# Patient Record
Sex: MALE | Race: WHITE
[De-identification: names, ages, dates, MRNs, and addresses within clinical notes are randomized per-mention and may not be internally consistent; named-entity substitution may affect disease eponyms.]

---

## 2018-12-05 ENCOUNTER — APPOINTMENT (OUTPATIENT)
Dept: FAMILY MEDICINE | Facility: CLINIC | Age: 28
End: 2018-12-05
Payer: MEDICAID

## 2018-12-05 VITALS
HEART RATE: 67 BPM | HEIGHT: 72 IN | WEIGHT: 206 LBS | BODY MASS INDEX: 27.9 KG/M2 | RESPIRATION RATE: 16 BRPM | DIASTOLIC BLOOD PRESSURE: 80 MMHG | OXYGEN SATURATION: 98 % | SYSTOLIC BLOOD PRESSURE: 120 MMHG

## 2018-12-05 DIAGNOSIS — Z87.11 PERSONAL HISTORY OF PEPTIC ULCER DISEASE: ICD-10-CM

## 2018-12-05 DIAGNOSIS — M25.561 PAIN IN RIGHT KNEE: ICD-10-CM

## 2018-12-05 DIAGNOSIS — Z23 ENCOUNTER FOR IMMUNIZATION: ICD-10-CM

## 2018-12-05 PROCEDURE — 99213 OFFICE O/P EST LOW 20 MIN: CPT | Mod: 25

## 2018-12-05 PROCEDURE — 90686 IIV4 VACC NO PRSV 0.5 ML IM: CPT

## 2018-12-05 PROCEDURE — G0008: CPT

## 2018-12-05 NOTE — PHYSICAL EXAM
[No Acute Distress] : no acute distress [Well Nourished] : well nourished [Normal Sclera/Conjunctiva] : normal sclera/conjunctiva [Normal Outer Ear/Nose] : the outer ears and nose were normal in appearance [No Respiratory Distress] : no respiratory distress  [Clear to Auscultation] : lungs were clear to auscultation bilaterally [No Accessory Muscle Use] : no accessory muscle use [Normal Rate] : normal rate  [No Edema] : there was no peripheral edema [No Extremity Clubbing/Cyanosis] : no extremity clubbing/cyanosis [Soft] : abdomen soft [Non Tender] : non-tender [Non-distended] : non-distended [No Masses] : no abdominal mass palpated [Normal Bowel Sounds] : normal bowel sounds [Normal Affect] : the affect was normal [Normal Mood] : the mood was normal [de-identified] : No ecchymosis.  Mild edema right lateral knee, no erythema.  Decreased flexion secondary to pain, tenderness to palpation right lateral knee.  Anterior and Posterior drawer test negative.
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
## 2018-12-05 NOTE — REVIEW OF SYSTEMS
[Joint Pain] : joint pain [Joint Stiffness] : joint stiffness [Negative] : Neurological [FreeTextEntry9] : right knee

## 2018-12-05 NOTE — PLAN
[FreeTextEntry1] : Received flu vaccine.  Advised Mr. CELINE MEJÍA they may experience some soreness, tenderness at the injection site.  Advised to call office if  not improving.  Mr. MEJÍA expressed understanding.\par \par Referred to Orthopedics for further evaluation, possible meniscal injury.  Advised patient may continue icing the area.  Advised caution with NSAIDs given history of stomach ulcer.  GI precautions discussed.  Advised to return for evaluation if not improving.  Patient expressed understanding.  \par \par \par

## 2019-01-08 ENCOUNTER — APPOINTMENT (OUTPATIENT)
Dept: INTERNAL MEDICINE | Facility: CLINIC | Age: 29
End: 2019-01-08

## 2019-01-10 ENCOUNTER — APPOINTMENT (OUTPATIENT)
Dept: INTERNAL MEDICINE | Facility: CLINIC | Age: 29
End: 2019-01-10

## 2022-09-30 ENCOUNTER — APPOINTMENT (OUTPATIENT)
Dept: ORTHOPEDIC SURGERY | Facility: CLINIC | Age: 32
End: 2022-09-30
Payer: OTHER MISCELLANEOUS

## 2022-09-30 VITALS — BODY MASS INDEX: 27.9 KG/M2 | HEIGHT: 72 IN | WEIGHT: 206 LBS

## 2022-09-30 DIAGNOSIS — S43.005A UNSPECIFIED DISLOCATION OF LEFT SHOULDER JOINT, INITIAL ENCOUNTER: ICD-10-CM

## 2022-09-30 PROCEDURE — 99204 OFFICE O/P NEW MOD 45 MIN: CPT

## 2022-09-30 PROCEDURE — 73030 X-RAY EXAM OF SHOULDER: CPT | Mod: LT

## 2022-09-30 PROCEDURE — 99072 ADDL SUPL MATRL&STAF TM PHE: CPT

## 2022-09-30 NOTE — WORK
[Dislocation] : dislocation [Torn Ligament/Tendon/Muscle] : torn ligament, tendon or muscle [Was the competent medical cause of the injury] : was the competent medical cause of the injury [Are consistent with the injury] : are consistent with the injury [Consistent with my objective findings] : consistent with my objective findings [Total] : total [Cannot return to work because ________] : cannot return to work because [unfilled] [Unknown at this time] : : unknown at this time [Patient] : patient

## 2022-09-30 NOTE — HISTORY OF PRESENT ILLNESS
[Work related] : work related [Sudden] : sudden [9] : 9 [7] : 7 [Dull/Aching] : dull/aching [Sharp] : sharp [Stabbing] : stabbing [Frequent] : frequent [Leisure] : leisure [Rest] : rest [Exercising] : exercising [Not working due to injury] : Work status: not working due to injury [de-identified] : WC 9/29/22 \par \par 31 y/o RHD male here today for the L shoulder.  He was moving a piece of plywood, the wind blew over another piece of wood which hit the wood he was holding.  He felt the shoulder dislocate, but he was able to self reduce it 20 minutes later.    He went to [facility] where xrays showed reduction, and he was placed in a sling.  He has a h/o R shoulder dislocation.  No prior history of pain or dislocation on the left shoulder.  He is OOW.  \par \par PMHx: denies, 3 cis ger day [] : Post Surgical Visit: no [FreeTextEntry1] : right shoulder  [FreeTextEntry3] : 9/29/22 [FreeTextEntry5] : pt was carrying a piece of ply wood and that is when another piece flew out injuring his right shoulder  [de-identified] : [facility] [de-identified] : xrays [de-identified] :

## 2022-09-30 NOTE — ASSESSMENT
[FreeTextEntry1] : L shoulder dislocation.\par Sling, NWB.\par MRI to eval for labral tear.\par OOW. \par RTO for review.

## 2022-10-01 ENCOUNTER — FORM ENCOUNTER (OUTPATIENT)
Age: 32
End: 2022-10-01

## 2022-10-02 ENCOUNTER — APPOINTMENT (OUTPATIENT)
Dept: MRI IMAGING | Facility: CLINIC | Age: 32
End: 2022-10-02
Payer: OTHER MISCELLANEOUS

## 2022-10-02 PROCEDURE — 99072 ADDL SUPL MATRL&STAF TM PHE: CPT

## 2022-10-02 PROCEDURE — 73221 MRI JOINT UPR EXTREM W/O DYE: CPT | Mod: LT

## 2022-10-05 ENCOUNTER — APPOINTMENT (OUTPATIENT)
Dept: ORTHOPEDIC SURGERY | Facility: CLINIC | Age: 32
End: 2022-10-05
Payer: OTHER MISCELLANEOUS

## 2022-10-05 VITALS — WEIGHT: 206 LBS | HEIGHT: 72 IN | BODY MASS INDEX: 27.9 KG/M2

## 2022-10-05 DIAGNOSIS — S43.432D SUPERIOR GLENOID LABRUM LESION OF LEFT SHOULDER, SUBSEQUENT ENCOUNTER: ICD-10-CM

## 2022-10-05 PROCEDURE — 99072 ADDL SUPL MATRL&STAF TM PHE: CPT

## 2022-10-05 PROCEDURE — 99215 OFFICE O/P EST HI 40 MIN: CPT

## 2022-10-05 NOTE — DATA REVIEWED
[MRI] : MRI [Left] : left [I independently reviewed and interpreted images and report] : I independently reviewed and interpreted images and report [FreeTextEntry1] : Anterior labral tear, some chronic appearance

## 2022-10-05 NOTE — HISTORY OF PRESENT ILLNESS
[Work related] : work related [Sudden] : sudden [Dull/Aching] : dull/aching [Sharp] : sharp [Stabbing] : stabbing [Frequent] : frequent [Leisure] : leisure [Rest] : rest [Exercising] : exercising [Not working due to injury] : Work status: not working due to injury [7] : 7 [5] : 5 [de-identified] : WC 9/29/22 \par \par 10/5/22: Here for MRI review.\par \par MRI l shoulder: \par 1. Findings consistent with a recent anterior shoulder dislocation episode resulting in Hill-Sachs lesion and  extensive labral tearing with effusion, synovitis, capsulitis, and soft tissue swelling without bony Bankart or  loose body.\par 2. Possible recent AC joint sprain superimposed on chronic AC joint pathology with mild separation, synovitis,  and capsular thickening, and mild coracoclavicular ligament sprain.\par 3. Moderate biceps tenosynovitis. \par 4. No rotator cuff tendon tear.\par 5. Clinical correlation and follow up is recommended.\par \par 9/30/22:  31 y/o RHD male here today for the L shoulder.  He was moving a piece of plywood, the wind blew over another piece of wood which hit the wood he was holding.  He felt the shoulder dislocate, but he was able to self reduce it 20 minutes later.    He went to [facility] where xrays showed reduction, and he was placed in a sling.  He has a h/o R shoulder dislocation.  No prior history of pain or dislocation on the left shoulder.  He is OOW.  \par \par PMHx: denies, 3 cis ger day [] : Post Surgical Visit: no [FreeTextEntry1] : right shoulder  [FreeTextEntry3] : 9/29/22 [FreeTextEntry5] : pt was carrying a piece of ply wood and that is when another piece flew out injuring his right shoulder  [de-identified] : [facility] [de-identified] : xrays [de-identified] : none  [de-identified] :

## 2022-10-05 NOTE — ASSESSMENT
[FreeTextEntry1] : L shoulder dislocation.\par MRI reviewed.\par Discussed op versus non op tx.\par Discussed rehab and recovery after L, SA, GHD, labral repair.\par He will try PT.\par RTW 10/10/22\par RTO 4 weeks.

## 2022-11-30 ENCOUNTER — APPOINTMENT (OUTPATIENT)
Dept: ORTHOPEDIC SURGERY | Facility: CLINIC | Age: 32
End: 2022-11-30

## 2023-01-30 ENCOUNTER — TRANSCRIPTION ENCOUNTER (OUTPATIENT)
Age: 33
End: 2023-01-30